# Patient Record
(demographics unavailable — no encounter records)

---

## 2024-10-23 NOTE — PHYSICAL EXAM
[General Appearance - Alert] : alert [General Appearance - In No Acute Distress] : in no acute distress [Sclera] : the sclera and conjunctiva were normal [PERRL With Normal Accommodation] : pupils were equal in size, round, and reactive to light [Hearing Threshold Finger Rub Not [place]] : hearing was normal [Both Tympanic Membranes Were Examined] : both tympanic membranes were normal [Neck Appearance] : the appearance of the neck was normal [] : no respiratory distress [Auscultation Breath Sounds / Voice Sounds] : lungs were clear to auscultation bilaterally [Heart Rate And Rhythm] : heart rate was normal and rhythm regular [Heart Sounds] : normal S1 and S2 [FreeTextEntry1] : s/p Left mastectomy [Bowel Sounds] : normal bowel sounds [Abdomen Soft] : soft

## 2024-10-23 NOTE — ASSESSMENT
[FreeTextEntry1] : 61 yo woman with DM, HTN and known CKD with baseline creat ~ 1.5. S/p Left Mastectomy and completed RT in 12/2023. Started on Anastrazole on 1/1 and Verzenio on 1/22/2024. Creat had been stable ~ 1.2-1.3 with weekly  IV hydration  but unable to continue due to LE edema with unclear cardiac function. Verzenio is now on hold due to acute increase in creat to ~2.0.  --RYAN/CKD :  continue daily Lasix for now  BUN/CREAT : 48/1.96 ( 10/11/2024)   66/2.33 ( 9/18/2024)    27/1.4 ( 7/22/2024)     --UA : continue to follow up for proteinuria--back on Verzenio.   negative for protein/RBC ( 7/22/2024)   Renal sonogram : R 8.3cm, L 9.2 cm  bilateral cysts  WNL.   UA with WBC but remains asymptomatic--unable to obtain clean catch speciman  --Left breast CA :  Restarted on Verzenio--100mg BID  --HTN : Bp control acceptable on low dose Coreg.  --DM : check UA USG for CKD.   Trajenta d/kyler  --LE edema : improved and stable.  Again clearly reviewed med adjustments.  --to decrease Lasix  to 40 mg daily    from BID dosing.  --Urinary frequency : evaluate with repeat labs, UA and urine culture, renal sonogram.

## 2024-10-23 NOTE — CONSULT LETTER
[Courtesy Letter:] : I had the pleasure of seeing your patient, [unfilled], in my office today. [Please see my note below.] : Please see my note below. [Consult Closing:] : Thank you very much for allowing me to participate in the care of this patient.  If you have any questions, please do not hesitate to contact me. [Sincerely,] : Sincerely, [DrMarie  ___] : Dr. CHRISTENSEN [DrMarie ___] : Dr. CHRISTENSEN

## 2024-10-23 NOTE — HISTORY OF PRESENT ILLNESS
[FreeTextEntry1] : Ms. Puentes presents for follow of RYAN/CKD Requested earlier follow up due to increased urinary frequency and urine volume for last 2-3 weeks. Lasix had been decreased  to 40 mg daily at last visit. Denies dysuria. Denies any significant change in po intake.

## 2024-11-15 NOTE — HISTORY OF PRESENT ILLNESS
[FreeTextEntry1] : Ms. Puentes presents for follow of RYAN/CKD Feels fairly ok. Reports some improvement in previously reported urinary frequency.

## 2024-11-15 NOTE — PHYSICAL EXAM
[General Appearance - Alert] : alert [General Appearance - In No Acute Distress] : in no acute distress [PERRL With Normal Accommodation] : pupils were equal in size, round, and reactive to light [Sclera] : the sclera and conjunctiva were normal [Hearing Threshold Finger Rub Not [place]] : hearing was normal [Both Tympanic Membranes Were Examined] : both tympanic membranes were normal [Neck Appearance] : the appearance of the neck was normal [] : no respiratory distress [Auscultation Breath Sounds / Voice Sounds] : lungs were clear to auscultation bilaterally [Heart Rate And Rhythm] : heart rate was normal and rhythm regular [Heart Sounds] : normal S1 and S2 [FreeTextEntry1] : s/p Left mastectomy [Bowel Sounds] : normal bowel sounds [Abdomen Soft] : soft

## 2024-11-15 NOTE — ASSESSMENT
[FreeTextEntry1] : 61 yo woman with DM, HTN and known CKD with baseline creat ~ 1.5. S/p Left Mastectomy and completed RT in 12/2023. Started on Anastrazole on 1/1 and Verzenio on 1/22/2024. Creat had been stable ~ 1.2-1.3 with weekly  IV hydration  but unable to continue due to LE edema with unclear cardiac function. Verzenio is now on hold due to acute increase in creat to ~2.0.  --RYAN/CKD :  continue daily Lasix for now  BUN/CREAT :  37/1.85 ( 11/1/2024)  48/1.96 ( 10/11/2024)   66/2.33 ( 9/18/2024)    27/1.4 ( 7/22/2024)     --UA : continue to follow up for proteinuria--back on Verzenio.   Prot/creat : 0.2 ( 11/11/2024)    negative for protein/RBC ( 7/22/2024)   Renal sonogram : R 8.3cm, L 9.2 cm  bilateral cysts  WNL.   UA with WBC but remains asymptomatic--unable to obtain clean catch speciman  --Left breast CA :  Restarted on Verzenio--100mg BID  --HTN : Bp control acceptable on low dose Coreg.  --DM : check UA USG for CKD.   Trajenta d/kyler  --LE edema : improved and stable.  Again clearly reviewed med adjustments.  --to decrease Lasix  to 40 mg daily    from BID dosing.   Again decrease Lasix to MWF.  --Urinary frequency : Recheck UA and urine culture--not done with recent UA.   Renal sonogram with increased echogenicity.   No significant post void volume.

## 2025-03-04 NOTE — HISTORY OF PRESENT ILLNESS
[FreeTextEntry1] : 60-year-old female with a history of A-fib, diabetes mellitus type 2 recently admitted to [facility] from February 4 to February 11, 2025 and was seen for E. coli bacteremia.  Patient was treated with Zosyn while in the hospital and was discharged on Bactrim double strength 1 tab every 12 hours which she completed on February 17, 2025.  Patient has been off antibiotics since.  Reports no complaints.  Is here for follow-up with her caretaker.  Patient recently saw her [facility] physician and had recent blood work done which she and her caretaker was told is within normal limits.  According to them no issues with her kidneys liver or electrolytes.  Reports no other complaints at this time.

## 2025-03-04 NOTE — ASSESSMENT
[FreeTextEntry1] : 60-year-old female here for follow-up of E. coli bacteremia  E. coli bacteremia  Recommendation: Had possible cultures on February 2 which cleared on February 4 and 5.  Was on Zosyn in the hospital and switched to Bactrim on discharge.  Completed course of Bactrim on February 17, 2025. Has been afebrile since. Had blood work done this week at [facility]. Advised to have blood work sent here for further review.  Follow-up as needed  Counseling included lab results, differential diagnosis, treatment options, risks and benefits, lifestyle changes, current condition, medications and dose adjustments. The patient was interactive attentive and asked questions and verbalized understanding.

## 2025-03-04 NOTE — PHYSICAL EXAM
[General Appearance - Alert] : alert [General Appearance - In No Acute Distress] : in no acute distress [Sclera] : the sclera and conjunctiva were normal [Outer Ear] : the ears and nose were normal in appearance [Exaggerated Use Of Accessory Muscles For Inspiration] : no accessory muscle use [Costovertebral Angle Tenderness] : no CVA tenderness [] : no rash Dr. Rodriguez

## 2025-03-07 NOTE — PHYSICAL EXAM
[General Appearance - Alert] : alert [General Appearance - In No Acute Distress] : in no acute distress [Sclera] : the sclera and conjunctiva were normal [PERRL With Normal Accommodation] : pupils were equal in size, round, and reactive to light [Hearing Threshold Finger Rub Not [place]] : hearing was normal [Both Tympanic Membranes Were Examined] : both tympanic membranes were normal [Neck Appearance] : the appearance of the neck was normal [] : no respiratory distress [Auscultation Breath Sounds / Voice Sounds] : lungs were clear to auscultation bilaterally [Heart Rate And Rhythm] : heart rate was normal and rhythm regular [Heart Sounds] : normal S1 and S2 [FreeTextEntry1] : s/p Left mastectomy [Bowel Sounds] : normal bowel sounds [Abdomen Soft] : soft

## 2025-03-07 NOTE — HISTORY OF PRESENT ILLNESS
[FreeTextEntry1] : Ms. Puentes presents for follow of RYAN/CKD Post [facility] admission for E coli urosepsis 2/4-11/2025. Post d/c, feels well and has no new complaints. D/w Pt's caretaker about personal hygiene, especially with bms.

## 2025-03-07 NOTE — ASSESSMENT
[FreeTextEntry1] : 59 yo woman with DM, HTN and known CKD with baseline creat ~ 1.5. S/p Left Mastectomy and completed RT in 12/2023. Started on Anastrazole on 1/1 and Verzenio on 1/22/2024.  --RYAN/CKD :  improved post recent admission  BUN/CREAT :  19/1.18 ( 2/11/2025)  37/1.85 ( 11/1/2024)  48/1.96 ( 10/11/2024)   66/2.33 ( 9/18/2024)    27/1.4 ( 7/22/2024)     --UA : continue to follow up for proteinuria--back on Verzenio.   Prot/creat : 0.2 ( 11/11/2024)    negative for protein/RBC ( 7/22/2024)   Renal sonogram : R 8.3cm, L 9.2 cm  bilateral cysts  WNL.   UA with WBC but remains asymptomatic--unable to obtain clean catch speciman  --Left breast CA :  Restarted on Verzenio--100mg BID  --HTN : Bp control acceptable on low dose Coreg.  --DM : check UA USG for CKD.   Trajenta d/kyler  --LE edema : improved and stable.   Continue Lasix 40 mg WMF.  --Anemia : post Urosepsis --continue to monitor   H/H : 8.9/27.3 ( 2/11/2025)

## 2025-06-20 NOTE — ASSESSMENT
[FreeTextEntry1] : 59 yo woman with DM, HTN and known CKD with baseline creat ~ 1.5. S/p Left Mastectomy and completed RT in 2023. Started on Anastrazole on  and Verzenio on 2024.  --RYAN/CKD : Has been variable.  BUN/CREAT :   42/1.97 ( 2025) 19/1.18 ( 2025)  37/1.85 ( 2024)  48/1.96 ( 10/11/2024)   66/2.33 ( 2024)    27/1.4 ( 2024)     --UA : continue to follow up for proteinuria--back on Verzenio.   Prot/creat :0.17 ( 2025)    0.2 ( 2024)    negative for protein/RBC ( 2024)   Renal sonogram : R 8.3cm, L 9.2 cm  bilateral cysts  WNL.   UA with WBC but remains asymptomatic--unable to obtain clean catch speciman  --Left breast CA :  Restarted on Verzenio--100mg BID  --HTN : Bp control acceptable on low dose Coreg.  --DM : check UA USG for CKD.   Trajenta d/kyler  --LE edema : improved and stable.   Lasix to 40 mg BIW--M/F.  --Anemia :GI work up.   H/H : 8.7/27.1 ( 2025)   8.9/27.3 ( 2025)

## 2025-06-20 NOTE — PHYSICAL EXAM
[General Appearance - Alert] : alert [General Appearance - In No Acute Distress] : in no acute distress [Sclera] : the sclera and conjunctiva were normal [PERRL With Normal Accommodation] : pupils were equal in size, round, and reactive to light [Hearing Threshold Finger Rub Not [place]] : hearing was normal [Both Tympanic Membranes Were Examined] : both tympanic membranes were normal [Neck Appearance] : the appearance of the neck was normal [] : no respiratory distress [Auscultation Breath Sounds / Voice Sounds] : lungs were clear to auscultation bilaterally [Heart Rate And Rhythm] : heart rate was normal and rhythm regular [Heart Sounds] : normal S1 and S2 [FreeTextEntry1] : s/p Left mastectomy [Bowel Sounds] : normal bowel sounds [Abdomen Soft] : soft

## 2025-06-20 NOTE — HISTORY OF PRESENT ILLNESS
[FreeTextEntry1] : Ms. Puentes presents for follow of RYAN/CKD No new events.  Had GI follow up due to anemia  Scheduled for Colonoscopy next week.  3/7/2025--Post [facility] admission for E coli urosepsis 2/4-11/2025. Post d/c, feels well and has no new complaints. D/w Pt's caretaker about personal hygiene, especially with bms.  11/15/2024--Feels fairly ok. Reports some improvement in previously reported urinary frequency.  10/23/2024--Requested earlier follow up due to increased urinary frequency and urine volume for last 2-3 weeks. Lasix had been decreased to 40 mg daily at last visit. Denies dysuria. Denies any significant change in po intake.  9/27/2024--Started on Allopurinol 100mg daily for gout flare up. Reports feeling fairly well. No new complaints. Reviewed meds again, Appears to be on Lasix 40 mg BID still.  8/2/2024--Pt is now restarted on Verzenio and tolerating well. Per previous discussion with Kelsie Gallardo, Ryan more likely related to recent escalation of diuretics. D/w Dr Lennie Lipscomb, Echo with well preserved LV function to allow lowering of diuretic doses. Pt and her caretaker are educated on adhering to low salt diet.  7/12/2024--Ms. Puentes presents for follow of RYAN. Has not had repeat labs or renal usg done. Reports feeling well. Left msg for Dr Lipscomb for evaluation of LV function and diuretics.  7/5/2024--60 yo woman with PMHX of DM and HTN for several years. Unclear cardiac hx but did have cardiac cath in 2021 with non-obstructive disease. Left breast CA was diagnosed in 1/2023. Left Mastectomy and axillary dissection for,stage IIIA, T2N1 ER+ HER2 breast CA on 9/25/2023. RT completed on 12/19/2023. Started on Anastrazole on 1/1/2024 and Verzenio on 1/22/2024. Pt had been getting weekly IV hydration for chronic dehydration. Noted for LE edema in April and weekly IV hydration was discontinued. Unclear initiation of Furosemide 40 mg BID and Metolazone rx per pt's Cardiologist. Metolazone appears to have been discontinued but ? when. ( left msg at pt;s cardiologist office , Dr Lennie Lipscomb) Noted with recent increase in creat to 2.0 and Verzenio is on hold until renal status is stabilized.  D/w pt's PMD, Dr Ron Carney and her primary nephrologist , Dr Brett Fields. Known CKD with baseline creat of 1.5 for a few years. D/w Dr Fields, will have pt follow up with me until RYAN is stabilized.